# Patient Record
Sex: MALE | Race: BLACK OR AFRICAN AMERICAN | NOT HISPANIC OR LATINO | URBAN - METROPOLITAN AREA
[De-identification: names, ages, dates, MRNs, and addresses within clinical notes are randomized per-mention and may not be internally consistent; named-entity substitution may affect disease eponyms.]

---

## 2020-02-26 ENCOUNTER — EMERGENCY (EMERGENCY)
Facility: HOSPITAL | Age: 60
LOS: 0 days | Discharge: HOME | End: 2020-02-26
Attending: EMERGENCY MEDICINE
Payer: COMMERCIAL

## 2020-02-26 VITALS
OXYGEN SATURATION: 97 % | DIASTOLIC BLOOD PRESSURE: 106 MMHG | HEART RATE: 71 BPM | TEMPERATURE: 98 F | RESPIRATION RATE: 20 BRPM | SYSTOLIC BLOOD PRESSURE: 182 MMHG

## 2020-02-26 VITALS
OXYGEN SATURATION: 96 % | DIASTOLIC BLOOD PRESSURE: 83 MMHG | TEMPERATURE: 98 F | HEART RATE: 64 BPM | RESPIRATION RATE: 15 BRPM | SYSTOLIC BLOOD PRESSURE: 149 MMHG

## 2020-02-26 DIAGNOSIS — R07.9 CHEST PAIN, UNSPECIFIED: ICD-10-CM

## 2020-02-26 LAB
ALBUMIN SERPL ELPH-MCNC: 3.4 G/DL — SIGNIFICANT CHANGE UP (ref 3.3–5)
ALP SERPL-CCNC: 97 U/L — SIGNIFICANT CHANGE UP (ref 40–120)
ALT FLD-CCNC: 50 U/L — SIGNIFICANT CHANGE UP (ref 12–78)
ANION GAP SERPL CALC-SCNC: 4 MMOL/L — LOW (ref 5–17)
APTT BLD: 26.9 SEC — LOW (ref 27.5–36.3)
AST SERPL-CCNC: 22 U/L — SIGNIFICANT CHANGE UP (ref 15–37)
BASOPHILS # BLD AUTO: 0.03 K/UL — SIGNIFICANT CHANGE UP (ref 0–0.2)
BASOPHILS NFR BLD AUTO: 0.6 % — SIGNIFICANT CHANGE UP (ref 0–2)
BILIRUB SERPL-MCNC: 0.3 MG/DL — SIGNIFICANT CHANGE UP (ref 0.2–1.2)
BUN SERPL-MCNC: 19 MG/DL — SIGNIFICANT CHANGE UP (ref 7–23)
CALCIUM SERPL-MCNC: 8.8 MG/DL — SIGNIFICANT CHANGE UP (ref 8.5–10.1)
CHLORIDE SERPL-SCNC: 106 MMOL/L — SIGNIFICANT CHANGE UP (ref 96–108)
CO2 SERPL-SCNC: 29 MMOL/L — SIGNIFICANT CHANGE UP (ref 22–31)
CREAT SERPL-MCNC: 1.39 MG/DL — HIGH (ref 0.5–1.3)
D DIMER BLD IA.RAPID-MCNC: 393 NG/ML DDU — HIGH
EOSINOPHIL # BLD AUTO: 0.16 K/UL — SIGNIFICANT CHANGE UP (ref 0–0.5)
EOSINOPHIL NFR BLD AUTO: 3.4 % — SIGNIFICANT CHANGE UP (ref 0–6)
GLUCOSE SERPL-MCNC: 106 MG/DL — HIGH (ref 70–99)
HCT VFR BLD CALC: 45.8 % — SIGNIFICANT CHANGE UP (ref 39–50)
HGB BLD-MCNC: 14.5 G/DL — SIGNIFICANT CHANGE UP (ref 13–17)
IMM GRANULOCYTES NFR BLD AUTO: 0.4 % — SIGNIFICANT CHANGE UP (ref 0–1.5)
INR BLD: 1.03 RATIO — SIGNIFICANT CHANGE UP (ref 0.88–1.16)
LYMPHOCYTES # BLD AUTO: 2.18 K/UL — SIGNIFICANT CHANGE UP (ref 1–3.3)
LYMPHOCYTES # BLD AUTO: 46.3 % — HIGH (ref 13–44)
MCHC RBC-ENTMCNC: 25.7 PG — LOW (ref 27–34)
MCHC RBC-ENTMCNC: 31.7 GM/DL — LOW (ref 32–36)
MCV RBC AUTO: 81.2 FL — SIGNIFICANT CHANGE UP (ref 80–100)
MONOCYTES # BLD AUTO: 0.7 K/UL — SIGNIFICANT CHANGE UP (ref 0–0.9)
MONOCYTES NFR BLD AUTO: 14.9 % — HIGH (ref 2–14)
NEUTROPHILS # BLD AUTO: 1.62 K/UL — LOW (ref 1.8–7.4)
NEUTROPHILS NFR BLD AUTO: 34.4 % — LOW (ref 43–77)
NRBC # BLD: 0 /100 WBCS — SIGNIFICANT CHANGE UP (ref 0–0)
PLATELET # BLD AUTO: 226 K/UL — SIGNIFICANT CHANGE UP (ref 150–400)
POTASSIUM SERPL-MCNC: 4.3 MMOL/L — SIGNIFICANT CHANGE UP (ref 3.5–5.3)
POTASSIUM SERPL-SCNC: 4.3 MMOL/L — SIGNIFICANT CHANGE UP (ref 3.5–5.3)
PROT SERPL-MCNC: 7.7 GM/DL — SIGNIFICANT CHANGE UP (ref 6–8.3)
PROTHROM AB SERPL-ACNC: 11.6 SEC — SIGNIFICANT CHANGE UP (ref 10–12.9)
RBC # BLD: 5.64 M/UL — SIGNIFICANT CHANGE UP (ref 4.2–5.8)
RBC # FLD: 13.7 % — SIGNIFICANT CHANGE UP (ref 10.3–14.5)
SODIUM SERPL-SCNC: 139 MMOL/L — SIGNIFICANT CHANGE UP (ref 135–145)
TROPONIN I SERPL-MCNC: <.015 NG/ML — SIGNIFICANT CHANGE UP (ref 0.01–0.04)
TROPONIN I SERPL-MCNC: <.015 NG/ML — SIGNIFICANT CHANGE UP (ref 0.01–0.04)
WBC # BLD: 4.71 K/UL — SIGNIFICANT CHANGE UP (ref 3.8–10.5)
WBC # FLD AUTO: 4.71 K/UL — SIGNIFICANT CHANGE UP (ref 3.8–10.5)

## 2020-02-26 PROCEDURE — 71045 X-RAY EXAM CHEST 1 VIEW: CPT | Mod: 26

## 2020-02-26 PROCEDURE — 93010 ELECTROCARDIOGRAM REPORT: CPT

## 2020-02-26 PROCEDURE — 71275 CT ANGIOGRAPHY CHEST: CPT | Mod: 26

## 2020-02-26 PROCEDURE — 99285 EMERGENCY DEPT VISIT HI MDM: CPT

## 2020-02-26 RX ORDER — SODIUM CHLORIDE 9 MG/ML
1000 INJECTION INTRAMUSCULAR; INTRAVENOUS; SUBCUTANEOUS ONCE
Refills: 0 | Status: COMPLETED | OUTPATIENT
Start: 2020-02-26 | End: 2020-02-26

## 2020-02-26 RX ORDER — FAMOTIDINE 10 MG/ML
20 INJECTION INTRAVENOUS ONCE
Refills: 0 | Status: COMPLETED | OUTPATIENT
Start: 2020-02-26 | End: 2020-02-26

## 2020-02-26 RX ADMIN — FAMOTIDINE 20 MILLIGRAM(S): 10 INJECTION INTRAVENOUS at 05:54

## 2020-02-26 RX ADMIN — Medication 30 MILLILITER(S): at 05:54

## 2020-02-26 RX ADMIN — SODIUM CHLORIDE 1000 MILLILITER(S): 9 INJECTION INTRAMUSCULAR; INTRAVENOUS; SUBCUTANEOUS at 05:54

## 2020-02-26 NOTE — ED ADULT TRIAGE NOTE - CHIEF COMPLAINT QUOTE
Pt came in clutching chest, stating he woke up with CP Pt came in clutching chest, stating he woke up with CP, took asa prior to arrival.

## 2020-02-26 NOTE — ED PROVIDER NOTE - OBJECTIVE STATEMENT
59 year old male with PMH of HTN otherwise recently was on prednisone for sinusitis complaining of chest pain upon waking up around 3-4am - states felt like a burning sensation, pt has had increased stomach discomfort since use of steroids this past week. Denies any fever/chills, no chest wall trauma, and states CP now slightly improved in ED. Noted also that his BP has been elevated.

## 2020-02-26 NOTE — ED ADULT NURSE NOTE - OBJECTIVE STATEMENT
Patient c/o chest pain, dizziness and headache X 2 hours. Patient took asp 181mg prior to arriving. Recently diagnosed with HTN and started Olmesartan 40mg 1x day.

## 2020-02-26 NOTE — ED ADULT NURSE REASSESSMENT NOTE - NS ED NURSE REASSESS COMMENT FT1
Endorsed by off going nurse. He is alert and oriented respiration spontaneous and unlabored. He is on cardiac monitoring NSR with sinus arrhythmia. IV access patent.. Pending CT scan and repeat Troponin to be drawn.

## 2020-02-26 NOTE — ED PROVIDER NOTE - PATIENT PORTAL LINK FT
You can access the FollowMyHealth Patient Portal offered by [facility] by registering at the following website: [email or web address]. By joining [employer]’s FollowMyHealth portal, you will also be able to view your health information using other applications (apps) compatible with our system.

## 2020-02-26 NOTE — ED PROVIDER NOTE - PROGRESS NOTE DETAILS
Pt was seen and treated by Dr. Art CTA chest pain 2nd tropo are pending Pt is alert and oriented x 3 sts he had mid lower chest discomfortable without sob earlier this morning and he is fine now. Pt ate and tolerated breakfast and sts he has no more pain. Pt is given and explained all test reports and advised to follow up with Dr. Myrick cardiologist for out pt stress test and return if symptoms persist or worsen.

## 2020-02-26 NOTE — ED PROVIDER NOTE - CARDIAC, MLM
Normal rate, regular rhythm.  Heart sounds S1, S2.  No murmurs, rubs or gallops. MDD vs schizophrenia vs intellectual disability

## 2020-02-26 NOTE — ED PROVIDER NOTE - CLINICAL SUMMARY MEDICAL DECISION MAKING FREE TEXT BOX
pt with D-Dimer elevation- will send for CTA, second enzyme ordered as pt with CE first set neg, Daughter Bonnie Martinez called - asked if we could call her if anything changes [phone]

## 2020-03-01 ENCOUNTER — EMERGENCY (EMERGENCY)
Facility: HOSPITAL | Age: 60
LOS: 0 days | Discharge: HOME | End: 2020-03-01
Attending: EMERGENCY MEDICINE
Payer: COMMERCIAL

## 2020-03-01 VITALS
OXYGEN SATURATION: 97 % | HEART RATE: 67 BPM | DIASTOLIC BLOOD PRESSURE: 76 MMHG | SYSTOLIC BLOOD PRESSURE: 144 MMHG | RESPIRATION RATE: 19 BRPM

## 2020-03-01 VITALS
TEMPERATURE: 98 F | HEIGHT: 71 IN | OXYGEN SATURATION: 95 % | SYSTOLIC BLOOD PRESSURE: 177 MMHG | HEART RATE: 85 BPM | DIASTOLIC BLOOD PRESSURE: 96 MMHG | WEIGHT: 220.02 LBS | RESPIRATION RATE: 17 BRPM

## 2020-03-01 DIAGNOSIS — R51 HEADACHE: ICD-10-CM

## 2020-03-01 DIAGNOSIS — R07.9 CHEST PAIN, UNSPECIFIED: ICD-10-CM

## 2020-03-01 DIAGNOSIS — F41.9 ANXIETY DISORDER, UNSPECIFIED: ICD-10-CM

## 2020-03-01 DIAGNOSIS — I10 ESSENTIAL (PRIMARY) HYPERTENSION: ICD-10-CM

## 2020-03-01 DIAGNOSIS — R00.2 PALPITATIONS: ICD-10-CM

## 2020-03-01 LAB
ALBUMIN SERPL ELPH-MCNC: 3.4 G/DL — SIGNIFICANT CHANGE UP (ref 3.3–5)
ALP SERPL-CCNC: 76 U/L — SIGNIFICANT CHANGE UP (ref 40–120)
ALT FLD-CCNC: 45 U/L — SIGNIFICANT CHANGE UP (ref 12–78)
ANION GAP SERPL CALC-SCNC: 5 MMOL/L — SIGNIFICANT CHANGE UP (ref 5–17)
APTT BLD: 36.8 SEC — HIGH (ref 27.5–36.3)
AST SERPL-CCNC: 25 U/L — SIGNIFICANT CHANGE UP (ref 15–37)
BILIRUB SERPL-MCNC: 0.5 MG/DL — SIGNIFICANT CHANGE UP (ref 0.2–1.2)
BUN SERPL-MCNC: 18 MG/DL — SIGNIFICANT CHANGE UP (ref 7–23)
CALCIUM SERPL-MCNC: 8.5 MG/DL — SIGNIFICANT CHANGE UP (ref 8.5–10.1)
CHLORIDE SERPL-SCNC: 108 MMOL/L — SIGNIFICANT CHANGE UP (ref 96–108)
CO2 SERPL-SCNC: 27 MMOL/L — SIGNIFICANT CHANGE UP (ref 22–31)
CREAT SERPL-MCNC: 1.38 MG/DL — HIGH (ref 0.5–1.3)
GLUCOSE SERPL-MCNC: 150 MG/DL — HIGH (ref 70–99)
HCT VFR BLD CALC: 47.2 % — SIGNIFICANT CHANGE UP (ref 39–50)
HGB BLD-MCNC: 14.9 G/DL — SIGNIFICANT CHANGE UP (ref 13–17)
INR BLD: 1.17 RATIO — HIGH (ref 0.88–1.16)
MAGNESIUM SERPL-MCNC: 2 MG/DL — SIGNIFICANT CHANGE UP (ref 1.6–2.6)
MCHC RBC-ENTMCNC: 25.4 PG — LOW (ref 27–34)
MCHC RBC-ENTMCNC: 31.6 GM/DL — LOW (ref 32–36)
MCV RBC AUTO: 80.5 FL — SIGNIFICANT CHANGE UP (ref 80–100)
NRBC # BLD: 0 /100 WBCS — SIGNIFICANT CHANGE UP (ref 0–0)
PLATELET # BLD AUTO: 228 K/UL — SIGNIFICANT CHANGE UP (ref 150–400)
POTASSIUM SERPL-MCNC: 3.7 MMOL/L — SIGNIFICANT CHANGE UP (ref 3.5–5.3)
POTASSIUM SERPL-SCNC: 3.7 MMOL/L — SIGNIFICANT CHANGE UP (ref 3.5–5.3)
PROT SERPL-MCNC: 7.7 GM/DL — SIGNIFICANT CHANGE UP (ref 6–8.3)
PROTHROM AB SERPL-ACNC: 13.2 SEC — HIGH (ref 10–12.9)
RBC # BLD: 5.86 M/UL — HIGH (ref 4.2–5.8)
RBC # FLD: 13.2 % — SIGNIFICANT CHANGE UP (ref 10.3–14.5)
SODIUM SERPL-SCNC: 140 MMOL/L — SIGNIFICANT CHANGE UP (ref 135–145)
TROPONIN I SERPL-MCNC: <.015 NG/ML — SIGNIFICANT CHANGE UP (ref 0.01–0.04)
TROPONIN I SERPL-MCNC: <.015 NG/ML — SIGNIFICANT CHANGE UP (ref 0.01–0.04)
WBC # BLD: 3.53 K/UL — LOW (ref 3.8–10.5)
WBC # FLD AUTO: 3.53 K/UL — LOW (ref 3.8–10.5)

## 2020-03-01 PROCEDURE — 93010 ELECTROCARDIOGRAM REPORT: CPT

## 2020-03-01 PROCEDURE — 99285 EMERGENCY DEPT VISIT HI MDM: CPT

## 2020-03-01 PROCEDURE — 70450 CT HEAD/BRAIN W/O DYE: CPT | Mod: 26

## 2020-03-01 RX ORDER — ALPRAZOLAM 0.25 MG
0.25 TABLET ORAL ONCE
Refills: 0 | Status: DISCONTINUED | OUTPATIENT
Start: 2020-03-01 | End: 2020-03-01

## 2020-03-01 RX ORDER — SODIUM CHLORIDE 9 MG/ML
1000 INJECTION INTRAMUSCULAR; INTRAVENOUS; SUBCUTANEOUS ONCE
Refills: 0 | Status: COMPLETED | OUTPATIENT
Start: 2020-03-01 | End: 2020-03-01

## 2020-03-01 RX ORDER — ACETAMINOPHEN 500 MG
975 TABLET ORAL ONCE
Refills: 0 | Status: COMPLETED | OUTPATIENT
Start: 2020-03-01 | End: 2020-03-01

## 2020-03-01 RX ORDER — METOCLOPRAMIDE HCL 10 MG
10 TABLET ORAL ONCE
Refills: 0 | Status: COMPLETED | OUTPATIENT
Start: 2020-03-01 | End: 2020-03-01

## 2020-03-01 RX ADMIN — SODIUM CHLORIDE 1000 MILLILITER(S): 9 INJECTION INTRAMUSCULAR; INTRAVENOUS; SUBCUTANEOUS at 18:13

## 2020-03-01 RX ADMIN — Medication 975 MILLIGRAM(S): at 19:14

## 2020-03-01 RX ADMIN — Medication 10 MILLIGRAM(S): at 18:17

## 2020-03-01 RX ADMIN — Medication 975 MILLIGRAM(S): at 18:14

## 2020-03-01 RX ADMIN — Medication 0.25 MILLIGRAM(S): at 18:14

## 2020-03-01 RX ADMIN — SODIUM CHLORIDE 1000 MILLILITER(S): 9 INJECTION INTRAMUSCULAR; INTRAVENOUS; SUBCUTANEOUS at 19:20

## 2020-03-01 NOTE — ED PROVIDER NOTE - CLINICAL SUMMARY MEDICAL DECISION MAKING FREE TEXT BOX
BP resolved to 140s without meds. BP resolved to 140s without meds. pt is comfortable and well appearing in ER. Lab values do not require emergent intervention. CT scan demonstrates no acute pathology. Pt likely has component of anxiety. Pt likely with tension headache and component of anxiety. Pt denies cardiac history and reports normal stress test 2 years ago. Pending clinical improvement and repeat Ce. Patient signed out to incoming physician.  All decisions regarding the progression of care will be made at their discretion.

## 2020-03-01 NOTE — ED PROVIDER NOTE - CARE PLAN
Principal Discharge DX:	Chest pain  Secondary Diagnosis:	Hypertension  Secondary Diagnosis:	Headache Principal Discharge DX:	Chest pain  Secondary Diagnosis:	Hypertension  Secondary Diagnosis:	Headache  Secondary Diagnosis:	Anxiety

## 2020-03-01 NOTE — ED PROVIDER NOTE - PATIENT PORTAL LINK FT
You can access the FollowMyHealth Patient Portal offered by [facility] by registering at the following website: [email or web address]. By joining [employer]’s FollowMyHealth portal, you will also be able to view your health information using other applications (apps) compatible with our system.

## 2020-03-01 NOTE — ED PROVIDER NOTE - OBJECTIVE STATEMENT
60yo male with pmh HTN, presents with headache, jaw tingling, ss chest tingling pta today and checked BP and it was in 190s. Pt reports BP has been fluctuating since diagnosis 2 weeks ago. Pt has SS chest pressure 3 days ago and was seen in ER with neg CE x 2 and CTA. pt states no symptoms until today. Pt reports he was placed on benicar and had a lot of side effects so it was changed to amlodipine 5mg 3 days ago. Pt states he was feeling BP symptoms and not well yesterday so took amlodipine 10mg yesterday night. denies dizziness. Pt also felt his eyes were more injected. Pt currently reports only mild frontal headache. No chest pain/jaw tingling.     ROS: No fever/chills. No photophobia/eye pain/changes in vision, No ear pain/sore throat/dysphagia, + chest pain, no palpitations. no SOB/cough. No abdominal pain, No N/V/D, no black/bloody bm. No dysuria/frequency/discharge, + headache. No Dizziness.  No rash.  No numbness/tingling/weakness. 60yo male with pmh newly diagnosed HTN, presents with bitemporal headache, Lt jaw tingling, palpitations, and ss chest tingling just pta.  Pt then checked BP and it was in 180s. Pt reports everything was okay until  when he was diagnosed with HTN.  Pt reports  had sinus issues, seen at  given abx and prednisone. Pt only took 3 days of abx but took prednisone x 4 days. Pt reports on the last day of taking it he was walking home and felt near syncope and disoriented. Pt went to  and BP noted to be 190s. Pt told to go to ER, but he called his cardio who gave him antihtn. Pt took the olmesartan and next day at PMD office noted it was 130/80. Pt states day after that has been having headaches/palpitations and cardiologist lowered dose. 3 days ago pt had ss chest pressure and came to ER, he had CTA and 2 neg CE. Pt then called PMD who gave him amlodipine 5mg. Pt reports had been having bitemporal headache and not feeling well since. Last night pt felt sick so took 10mg of amlodipine. Pt woke up and felt good and bp was 140/80. Pt felt well until just prior to arrival when he was lying down and the temporal headache started. Pt then sat up and felt dizzy and near syncopal. then the palpitations started and some tingling in jaw and chest. Pt then though his had some periorbital edema under his eyes. And then he checked BP and it was 190s. Of note, pt had been dealing with multiple stressors. Both parents  in 6 months and he has been dealing with their estate as well as sibling issues.  Pt currently feeling well only c/o of ringing in ears and bitemporal head throbbing. no photophobia, dizziness, NV, CP, sob, palpitations.    ROS: No fever/chills. No photophobia/eye pain/changes in vision, No ear pain/sore throat/dysphagia, + chest pain, + palpitations. no SOB/cough. No abdominal pain, No N/V/D, no black/bloody bm. No dysuria/frequency/discharge, + headache. + Dizziness.  No rash.  No numbness/tingling/weakness.

## 2020-03-01 NOTE — ED ADULT NURSE NOTE - OBJECTIVE STATEMENT
BIBA patient states that blood pressure has been up and down since 2/19, reports ruptured blood vessels denies blurry vision, headache that subsided. stopped taking benicar due to side effects  amlodipine 5mg, doubled dose last night.     pt a&O x3 recently see here Tuesday for htn, dizziness, prescribed amlodipine 5 mg. pt cleared abd sent home. today pt present  c,o of bilateral posterior intermittent head pain 8/10.  pt states also achy bilateral lower abd pain. pt denies burning urination, hematuria. pt c,o of dizziness headache. pt denies chest pain at this time.

## 2020-03-01 NOTE — ED PROVIDER NOTE - CARE PROVIDER_API CALL
Jorge Mcnulty)  Cardiology  [address] [facility], [address] [place] [address]  Phone: [phone]  Fax: [phone]  Follow Up Time: 4-6 Days

## 2020-03-01 NOTE — ED PROVIDER NOTE - PROGRESS NOTE DETAILS
Results reported to patient--grossly benign, labs and imaging unremarkable   Pt. reports feeling better after meds  pt. agrees to f/u with primary care outpt., referred to cardio for f/u   pt. understands to return to ED if symptoms worsen; will d/c as per plan with prior attending of record

## 2020-03-01 NOTE — ED ADULT TRIAGE NOTE - CHIEF COMPLAINT QUOTE
BIBA patient states that blood pressure has been up and down since 2/19, reports ruptured blood vessels denies blurry vision, headache that subsided. stopped taking benicar due to side effects  amlodipine 5mg, doubled dose last night

## 2020-03-01 NOTE — ED PROVIDER NOTE - PHYSICAL EXAMINATION
Gen: Alert, Well appearing. NAD  , + appears anxious  Head: NC, AT, PERRL, normal lids/conjunctiva   ENT: Bilateral TM WNL, patent oropharynx without erythema/exudate, uvula midline  Neck: supple, no tenderness/meningismus  Pulm: Bilateral clear BS, normal resp effort  CV: RRR, no M/R/G, +dist pulses   Abd: soft, NT/ND, +BS, no guarding/rebound tenderness  Mskel: no edema/erythema/cyanosis   Skin: no rash, no bruising  Neuro: AAOx3, no sensory/motor deficits, CN 2-12 intact Gen: Alert, Well appearing. NAD  , + appears anxious  Head: NC, AT, PERRL, normal lids/conjunctiva   ENT: Bilateral TM WNL, patent oropharynx without erythema/exudate, uvula midline  Neck: supple, no tenderness/meningismus  Pulm: Bilateral clear BS, normal resp effort  CV: RRR, no M/R/G, +dist pulses   Abd: soft, NT/ND, +BS, no guarding/rebound tenderness  Mskel: no edema/erythema/cyanosis   Skin: no rash, no bruising  Neuro: AAOx3, no sensory/motor deficits. CN2-12 intact, No pronator drift. Normal finger to nose, heel to shin. No dysdiadochokinesis. Full power and sensation intact. Gait WNL.

## 2020-03-01 NOTE — ED ADULT NURSE NOTE - ED STAT RN HANDOFF DETAILS
Report received from LEANNA Overton at 7pm. Assessment available on KB. will continue to monitor. on cardiac monitor at bedside. family at bedside. awaiting repeat EKG & troponin Report received from LEANNA Overton at 7pm. Assessment available on KB. will continue to monitor. on cardiac monitor at bedside. family at bedside. awaiting repeat EKG & troponin. IVF infusing

## 2020-12-30 ENCOUNTER — TRANSCRIPTION ENCOUNTER (OUTPATIENT)
Age: 60
End: 2020-12-30

## 2021-01-30 DIAGNOSIS — Z01.818 ENCOUNTER FOR OTHER PREPROCEDURAL EXAMINATION: ICD-10-CM

## 2021-01-30 PROBLEM — Z00.00 ENCOUNTER FOR PREVENTIVE HEALTH EXAMINATION: Status: ACTIVE | Noted: 2021-01-30

## 2021-02-02 ENCOUNTER — APPOINTMENT (OUTPATIENT)
Dept: DISASTER EMERGENCY | Facility: CLINIC | Age: 61
End: 2021-02-02

## 2021-02-03 LAB — SARS-COV-2 N GENE NPH QL NAA+PROBE: NOT DETECTED

## 2021-10-04 ENCOUNTER — TRANSCRIPTION ENCOUNTER (OUTPATIENT)
Age: 61
End: 2021-10-04

## 2022-03-11 ENCOUNTER — APPOINTMENT (OUTPATIENT)
Dept: UROLOGY | Facility: CLINIC | Age: 62
End: 2022-03-11
Payer: COMMERCIAL

## 2022-03-11 VITALS
RESPIRATION RATE: 14 BRPM | WEIGHT: 220 LBS | HEART RATE: 65 BPM | HEIGHT: 72 IN | TEMPERATURE: 97.9 F | BODY MASS INDEX: 29.8 KG/M2 | SYSTOLIC BLOOD PRESSURE: 129 MMHG | DIASTOLIC BLOOD PRESSURE: 83 MMHG | OXYGEN SATURATION: 98 %

## 2022-03-11 DIAGNOSIS — Z78.9 OTHER SPECIFIED HEALTH STATUS: ICD-10-CM

## 2022-03-11 DIAGNOSIS — N48.89 OTHER SPECIFIED DISORDERS OF PENIS: ICD-10-CM

## 2022-03-11 DIAGNOSIS — Z80.42 FAMILY HISTORY OF MALIGNANT NEOPLASM OF PROSTATE: ICD-10-CM

## 2022-03-11 DIAGNOSIS — R35.1 NOCTURIA: ICD-10-CM

## 2022-03-11 DIAGNOSIS — Z12.5 ENCOUNTER FOR SCREENING FOR MALIGNANT NEOPLASM OF PROSTATE: ICD-10-CM

## 2022-03-11 DIAGNOSIS — Z86.79 PERSONAL HISTORY OF OTHER DISEASES OF THE CIRCULATORY SYSTEM: ICD-10-CM

## 2022-03-11 DIAGNOSIS — Z80.0 FAMILY HISTORY OF MALIGNANT NEOPLASM OF DIGESTIVE ORGANS: ICD-10-CM

## 2022-03-11 PROCEDURE — 99204 OFFICE O/P NEW MOD 45 MIN: CPT

## 2022-03-11 NOTE — PHYSICAL EXAM
[General Appearance - Well Developed] : well developed [Heart Rate And Rhythm] : Heart rate and rhythm were normal [] : no respiratory distress [Bowel Sounds] : normal bowel sounds [Urethral Meatus] : meatus normal [Epididymis] : the epididymides were normal [Testes Tenderness] : no tenderness of the testes [Prostate Enlargement] : the prostate was not enlarged [Prostate Tenderness] : the prostate was not tender [No Prostate Nodules] : no prostate nodules [FreeTextEntry1] : On the dorsal surface of the distal shaft, there is a ~2 cm x 1mm tubular, firm lesion that is mobile, not adhered to underlying structures. no overlying skin changes. no erythema. mild pain with palpation [Normal Station and Gait] : the gait and station were normal for the patient's age [Skin Color & Pigmentation] : normal skin color and pigmentation [No Focal Deficits] : no focal deficits [Oriented To Time, Place, And Person] : oriented to person, place, and time [No Palpable Adenopathy] : no palpable adenopathy

## 2022-03-11 NOTE — ASSESSMENT
[FreeTextEntry1] : Mr Martinez is a 61 y.o. M who presents with the following:\par \par #Penile concern following intercourse: Appears to have a thrombosed superficial vessel following vigorous intercourse.  This this is mobile, and unlikely to be a plaque of the tunica. Unlikely to be a penile fracture, given the absence of the pop, persistence of erections, lack of swelling, and bruising.  Discussed that if this is a thrombosis, this will likely slowly improve over the following weeks.  Discussed he can try Tylenol Motrin as needed for pain control. Recommended he avoid sexual intercourse for the next 1-2 weeks.\par \par #PSA screening: Prostate cancer screening was discussed today in detail.  I reviewed with the patient that the PSA is a nonspecific test for prostate cancer but is specific to the prostate.  We reviewed that multiple issues related to the prostate can all cause PSA to increase including prostate enlargement, prostate cancer, and prostate inflammation.  Discussed AUA guidelines on PSA screening. He is  and has a family history. DIMA negative.\par - PSA today\par - UA

## 2022-03-11 NOTE — HISTORY OF PRESENT ILLNESS
[FreeTextEntry1] : NIRALI KENDALL is a 61 year M who presents today as a new patient evaluation for the following:\par \par #Penile concern:\par He was having sexual intercourse on Monday evening and during this, his penis exited his partners vagina ("from behind") and hit his penis against his partner's perineum when trying to re-enter his penis. He denies hearing a pop, and did not lose his erection. Did not have pain at this time. He was able to orgasm and ejaculate shortly after this.  He was in the shower following this, he began to notice a small amount of pain in his penis which persisted to the next day.  The next day, he noticed a "hard knot "on the distal part of his shaft.  This is been becoming more soft over the past several days.  He has less pain today.  Denies any bruising, swelling.  Denies any gross hematuria, dysuria.  He is still having erections, but has not had intercourse since.\par \par #PSA screening\par His father, and maternal grandfather both have prostate cancer.  Both parents from this disease.  He does not remember his last PSA check. Has nocturia x 0-2, which varies based on his work schedule (he works midnights). Denies retention episodes, UTIs. Denies gross hematuria, flank pain, fevers, chills, nausea, vomiting.

## 2022-03-11 NOTE — REVIEW OF SYSTEMS
[Heartburn] : heartburn [Wake up at night to urinate  How many times?  ___] : wakes up to urinate [unfilled] times during the night [Negative] : Heme/Lymph

## 2022-03-14 LAB
APPEARANCE: CLEAR
BACTERIA: NEGATIVE
BILIRUBIN URINE: NEGATIVE
BLOOD URINE: NEGATIVE
COLOR: NORMAL
GLUCOSE QUALITATIVE U: NEGATIVE
HYALINE CASTS: 0 /LPF
KETONES URINE: NEGATIVE
LEUKOCYTE ESTERASE URINE: NEGATIVE
MICROSCOPIC-UA: NORMAL
NITRITE URINE: NEGATIVE
PH URINE: 6
PROTEIN URINE: NEGATIVE
PSA SERPL-MCNC: 0.55 NG/ML
RED BLOOD CELLS URINE: 2 /HPF
SPECIFIC GRAVITY URINE: 1.02
SQUAMOUS EPITHELIAL CELLS: 0 /HPF
UROBILINOGEN URINE: NORMAL
WHITE BLOOD CELLS URINE: 0 /HPF

## 2022-04-05 ENCOUNTER — APPOINTMENT (OUTPATIENT)
Dept: ORTHOPEDIC SURGERY | Facility: CLINIC | Age: 62
End: 2022-04-05
Payer: OTHER MISCELLANEOUS

## 2022-04-05 PROCEDURE — 99213 OFFICE O/P EST LOW 20 MIN: CPT

## 2022-04-05 PROCEDURE — 99072 ADDL SUPL MATRL&STAF TM PHE: CPT

## 2022-04-05 NOTE — REASON FOR VISIT
[FreeTextEntry2] : left shoulder here for followup missed a couple visits for therapy sicne he was away,  still has some stiffness at the end range \par he is working full duty \par going to therapy

## 2022-04-05 NOTE — DISCUSSION/SUMMARY
[Medication Risks Reviewed] : Medication risks reviewed [de-identified] : receommend to xont terapy for one more month and will cont strengething\par  will follow up as needed

## 2022-06-07 ENCOUNTER — APPOINTMENT (OUTPATIENT)
Dept: ORTHOPEDIC SURGERY | Facility: CLINIC | Age: 62
End: 2022-06-07
Payer: COMMERCIAL

## 2022-06-07 VITALS — BODY MASS INDEX: 29.12 KG/M2 | WEIGHT: 215 LBS | HEIGHT: 72 IN

## 2022-06-07 PROCEDURE — 73110 X-RAY EXAM OF WRIST: CPT | Mod: LT

## 2022-06-07 PROCEDURE — 99213 OFFICE O/P EST LOW 20 MIN: CPT

## 2022-06-07 NOTE — IMAGING
[Left] : left wrist [There are no fractures, subluxations or dislocations. No significant abnormalities are seen] : There are no fractures, subluxations or dislocations. No significant abnormalities are seen

## 2022-06-08 NOTE — HISTORY OF PRESENT ILLNESS
[4] : 4 [0] : 0 [Dull/Aching] : dull/aching [Localized] : localized [Intermittent] : intermittent [Full time] : Work status: full time [de-identified] : 6/7/22:  Pt has mass on left volar wrist [] : no [FreeTextEntry1] : Lt hand [FreeTextEntry3] : 3 months [FreeTextEntry5] : Pt states he has a cyst on the  palm of his lt hand   [de-identified] : None

## 2022-06-08 NOTE — ASSESSMENT
[FreeTextEntry1] : mass disappears with pressure- mri to eval for possible ganglion versus pseudoaneurysm\par discussed possibility of surgery

## 2022-06-10 ENCOUNTER — APPOINTMENT (OUTPATIENT)
Dept: MRI IMAGING | Facility: CLINIC | Age: 62
End: 2022-06-10

## 2022-06-14 ENCOUNTER — APPOINTMENT (OUTPATIENT)
Dept: ORTHOPEDIC SURGERY | Facility: CLINIC | Age: 62
End: 2022-06-14

## 2022-06-27 ENCOUNTER — FORM ENCOUNTER (OUTPATIENT)
Age: 62
End: 2022-06-27

## 2022-06-28 ENCOUNTER — APPOINTMENT (OUTPATIENT)
Dept: MRI IMAGING | Facility: CLINIC | Age: 62
End: 2022-06-28

## 2022-06-28 PROCEDURE — 73221 MRI JOINT UPR EXTREM W/O DYE: CPT | Mod: LT

## 2022-07-14 ENCOUNTER — APPOINTMENT (OUTPATIENT)
Dept: ORTHOPEDIC SURGERY | Facility: CLINIC | Age: 62
End: 2022-07-14

## 2022-07-14 VITALS — BODY MASS INDEX: 29.12 KG/M2 | WEIGHT: 215 LBS | HEIGHT: 72 IN

## 2022-07-14 PROCEDURE — 99214 OFFICE O/P EST MOD 30 MIN: CPT

## 2022-07-14 NOTE — ASSESSMENT
[FreeTextEntry1] : We reviewed the pathology and treatment options- patient aware that options include observation, aspiration, surgery- aspiration with 50/50 chance of resolution, surgery usually 95-97% effective, although some studies indicate that recurrence may be as high as 25%. Today the patient is amenable to surgical excision,  Understands that with volar masses chance of arterial injury which may result in ecchymosis and occasional hematoma, recurrence, stiffness due to dissection to the joint capsule as well as general risks of surgery- bleeding, infection, injury to nerves, vessels or tendons, need for future surgery, loss of limb, loss of life. all questions answered and patient agrees to the surgical plan.  post op protocol and expectations were reviewed.\par

## 2022-07-14 NOTE — HISTORY OF PRESENT ILLNESS
[4] : 4 [0] : 0 [Dull/Aching] : dull/aching [Localized] : localized [Intermittent] : intermittent [Full time] : Work status: full time [de-identified] : 7/14/22:  Pt here s/p MRI\par \par MRI left wrist:\par 1. Slightly smaller soft tissue cystic area in the volar radial aspect of the wrist, which extends superficially, over \par an area measuring 1.2 cm in the same location as the prior study but smaller. The findings could represent soft \par tissue ganglion. The possibility of a vascular structure cannot be excluded. There are no aggressive MRI \par characteristics. Consider ultrasound Doppler to further evaluate for any internal blood flow.\par 2. Complex TFCC tearing, partial scapholunate ligament tearing and slight radiocarpal effusion without acute \par fracture, malalignment or tendon discontinuity.\par \par 6/7/22:  Pt has mass on left volar wrist [] : no [FreeTextEntry1] : Lt hand [FreeTextEntry3] : 3 months [FreeTextEntry5] : Pt states he has a cyst on the  palm of his lt hand   [de-identified] : None

## 2022-07-28 ENCOUNTER — APPOINTMENT (OUTPATIENT)
Dept: ORTHOPEDIC SURGERY | Facility: CLINIC | Age: 62
End: 2022-07-28

## 2022-07-28 VITALS — HEIGHT: 62 IN | BODY MASS INDEX: 39.56 KG/M2 | WEIGHT: 215 LBS

## 2022-07-28 PROCEDURE — 72110 X-RAY EXAM L-2 SPINE 4/>VWS: CPT

## 2022-07-28 PROCEDURE — 99213 OFFICE O/P EST LOW 20 MIN: CPT

## 2022-07-28 NOTE — ASSESSMENT
[FreeTextEntry1] : 62 y/o M with chronic back pain, failed conservative mgmt over the last 3 months with nsaids, HEP and chiro. Patient will obtain MRI of lumbar spine to evaluate HNP. Has responded well to TPIs in the past, consider repeat pending results.

## 2022-07-28 NOTE — PHYSICAL EXAM
[Able to Communicate] : able to communicate [Well Developed] : well developed [Well Nourished] : well nourished [Peripheral vascular exam is grossly normal] : peripheral vascular exam is grossly normal [No Respiratory Distress] : no respiratory distress [Lungs clear to auscultation bilaterally] : lungs clear to auscultation bilaterally [Flexion] : flexion [Extension] : extension [Bending to left] : bending to left [Bending to right] : bending to right [No bony abnormalities] : No bony abnormalities [] : motor exam is 5/5 throughout both lower extremities with normal tone [TWNoteComboBox7] : forward flexion 60 degrees [de-identified] : extension 30 degrees

## 2022-08-02 ENCOUNTER — APPOINTMENT (OUTPATIENT)
Dept: ORTHOPEDIC SURGERY | Facility: CLINIC | Age: 62
End: 2022-08-02

## 2022-08-02 ENCOUNTER — FORM ENCOUNTER (OUTPATIENT)
Age: 62
End: 2022-08-02

## 2022-08-02 VITALS — BODY MASS INDEX: 29.12 KG/M2 | WEIGHT: 215 LBS | HEIGHT: 72 IN

## 2022-08-02 PROCEDURE — 99072 ADDL SUPL MATRL&STAF TM PHE: CPT

## 2022-08-02 PROCEDURE — 99214 OFFICE O/P EST MOD 30 MIN: CPT | Mod: 25

## 2022-08-02 PROCEDURE — 20611 DRAIN/INJ JOINT/BURSA W/US: CPT

## 2022-08-02 PROCEDURE — J3490M: CUSTOM

## 2022-08-02 NOTE — PROCEDURE
[Large Joint Injection] : Large joint injection [Left] : of the left [Subacromial Space] : subacromial space [Inflammation] : inflammation [Betadine] : betadine [Ethyl Chloride sprayed topically] : ethyl chloride sprayed topically [Sterile technique used] : sterile technique used [___ cc    3mg] :  Betamethasone (Celestone) ~Vcc of 3mg [___ cc    0.25%] : Bupivacaine (Marcaine) ~Vcc of 0.25%  [] : Patient tolerated procedure well [Call if redness, pain or fever occur] : call if redness, pain or fever occur [Apply ice for 15min out of every hour for the next 12-24 hours as tolerated] : apply ice for 15 minutes out of every hour for the next 12-24 hours as tolerated [Previous OTC use and PT nontherapeutic] : patient has tried OTC's including aspirin, Ibuprofen, Aleve, etc or prescription NSAIDS, and/or exercises at home and/or physical therapy without satisfactory response [Risks, benefits, alternatives discussed / Verbal consent obtained] : the risks benefits, and alternatives have been discussed, and verbal consent was obtained [Precise injection in area of tear] : precise injection in area of tear [All ultrasound images have been permanently captured and stored accordingly in our picture archiving and communication system] : All ultrasound images have been permanently captured and stored accordingly in our picture archiving and communication system [Visualization of the needle and placement of injection was performed without complication] : visualization of the needle and placement of injection was performed without complication [Pain] : pain

## 2022-08-03 ENCOUNTER — APPOINTMENT (OUTPATIENT)
Dept: MRI IMAGING | Facility: CLINIC | Age: 62
End: 2022-08-03

## 2022-08-03 PROCEDURE — 72148 MRI LUMBAR SPINE W/O DYE: CPT

## 2022-08-03 NOTE — HISTORY OF PRESENT ILLNESS
[de-identified] : pt is here for mike shoulder follow up WC 3/13/15. pain when turning, reaching for something, driving at night. pt uses ice, heat and voltaren gel. Sp 2019, 2021 rtc repair both shoulders. pt works full time, looking to retire next month

## 2022-08-03 NOTE — DISCUSSION/SUMMARY
[Medication Risks Reviewed] : Medication risks reviewed [Surgical risks reviewed] : Surgical risks reviewed [de-identified] : signs and symptoms of rotator cuff tear, discussed risks of potential rotator cuff  surgery and risks of operative  and non operative treatment of of tendonitis and impingement, will obtain mri to see if surgery is necessary and guide future treatment, in interim discussed use of nsaids and side effects and recommended rehab and discussed risks and benefits of injection\par \par \par evaluated shoulder and decided to proceed with celestone injections, discussed future treatment and option, will proceed, discussed possible surgery vs alternatives in future\par The risks, benefits and contents of the injection have been discussed.  Risks include but are not limited to allergic reaction, flare reaction, permanent white skin discoloration at the injection site and infection.  The patient understands the risks and agrees to having the injection.  All questions have been answered.\par celestone in the LEFT SHOULDER Discussed the timing of the injections and the follow up that is needed. Advised the pt to ice the area that was injected and informed the pt it may take a few days for the injection to give relief.\par \par \par

## 2022-08-08 ENCOUNTER — APPOINTMENT (OUTPATIENT)
Dept: ORTHOPEDIC SURGERY | Facility: CLINIC | Age: 62
End: 2022-08-08

## 2022-08-08 VITALS — WEIGHT: 215 LBS | BODY MASS INDEX: 29.12 KG/M2 | HEIGHT: 72 IN

## 2022-08-08 PROCEDURE — 99214 OFFICE O/P EST MOD 30 MIN: CPT

## 2022-08-08 NOTE — HISTORY OF PRESENT ILLNESS
[Lower back] : lower back [6] : 6 [Shooting] : shooting [de-identified] : back pain and heel pain ; all the time;  neck has begun to hurt as well [FreeTextEntry5] :  NIRALI KENDALL is a 61 year male who is here today for lower back pain. Had an MRI done and is here to review results. He states he gets a shooting pain on and off. [de-identified] : MRI

## 2022-08-08 NOTE — ASSESSMENT
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
none

## 2022-08-30 ENCOUNTER — LABORATORY RESULT (OUTPATIENT)
Age: 62
End: 2022-08-30

## 2022-08-30 ENCOUNTER — APPOINTMENT (OUTPATIENT)
Dept: PAIN MANAGEMENT | Facility: CLINIC | Age: 62
End: 2022-08-30

## 2022-09-01 ENCOUNTER — RESULT REVIEW (OUTPATIENT)
Age: 62
End: 2022-09-01

## 2022-09-02 ENCOUNTER — APPOINTMENT (OUTPATIENT)
Age: 62
End: 2022-09-02

## 2022-09-02 PROCEDURE — 25076 EXC FOREARM TUM DEEP < 3 CM: CPT | Mod: LT

## 2022-09-15 ENCOUNTER — APPOINTMENT (OUTPATIENT)
Dept: ORTHOPEDIC SURGERY | Facility: CLINIC | Age: 62
End: 2022-09-15

## 2022-09-15 VITALS — WEIGHT: 215 LBS | BODY MASS INDEX: 29.12 KG/M2 | HEIGHT: 72 IN

## 2022-09-15 PROCEDURE — 99024 POSTOP FOLLOW-UP VISIT: CPT

## 2022-09-15 NOTE — HISTORY OF PRESENT ILLNESS
[4] : 4 [0] : 0 [Dull/Aching] : dull/aching [Localized] : localized [Intermittent] : intermittent [Full time] : Work status: full time [de-identified] : DOS: 9/2/2022\par \par 9/15/2022: Pt here for f/u s/p  left wrist ganglion excision. \par Pathology report 9/2/2022 shows a ganglion cyst.\par Pt reports only minimal discomfort. \par \par \par 7/14/22:  Pt here s/p MRI\par \par MRI left wrist:\par 1. Slightly smaller soft tissue cystic area in the volar radial aspect of the wrist, which extends superficially, over \par an area measuring 1.2 cm in the same location as the prior study but smaller. The findings could represent soft \par tissue ganglion. The possibility of a vascular structure cannot be excluded. There are no aggressive MRI \par characteristics. Consider ultrasound Doppler to further evaluate for any internal blood flow.\par 2. Complex TFCC tearing, partial scapholunate ligament tearing and slight radiocarpal effusion without acute \par fracture, malalignment or tendon discontinuity.\par \par 6/7/22:  Pt has mass on left volar wrist [] : no [FreeTextEntry1] : Lt hand [FreeTextEntry3] : 3 months [FreeTextEntry5] : Pt states he has a cyst on the  palm of his lt hand   [de-identified] : None

## 2022-09-15 NOTE — IMAGING
[Left] : left wrist [There are no fractures, subluxations or dislocations. No significant abnormalities are seen] : There are no fractures, subluxations or dislocations. No significant abnormalities are seen [de-identified] : Left wrist:\par wound clean dry and intact\par no erythema\par no drainage\par FAROM\par Strength is 5/5\par NV unchanged\par sutures removed\par

## 2022-12-20 ENCOUNTER — APPOINTMENT (OUTPATIENT)
Dept: ORTHOPEDIC SURGERY | Facility: CLINIC | Age: 62
End: 2022-12-20

## 2022-12-20 VITALS — BODY MASS INDEX: 29.12 KG/M2 | HEIGHT: 72 IN | WEIGHT: 215 LBS

## 2022-12-20 DIAGNOSIS — M25.569 PAIN IN UNSPECIFIED KNEE: ICD-10-CM

## 2022-12-20 PROCEDURE — 20611 DRAIN/INJ JOINT/BURSA W/US: CPT | Mod: LT

## 2022-12-20 PROCEDURE — 99214 OFFICE O/P EST MOD 30 MIN: CPT | Mod: 25

## 2022-12-20 PROCEDURE — 73562 X-RAY EXAM OF KNEE 3: CPT | Mod: LT

## 2022-12-20 PROCEDURE — J3490M: CUSTOM | Mod: LT

## 2022-12-20 NOTE — DISCUSSION/SUMMARY
[de-identified] : Progress Note completed by Meera Coulter PA-C\par * Dr. Cook -- The documentation recorded in this note accurately reflects the decisions made by me during this visit.

## 2022-12-20 NOTE — HISTORY OF PRESENT ILLNESS
[8] : 8 [7] : 7 [Constant] : constant [de-identified] : 12/20/22:  acute onset of left knee pain after feeling a sharp pain in knee after getting a massage october 2022.   [FreeTextEntry1] : left knee [FreeTextEntry5] : No injury, patient started occurring gradually

## 2022-12-20 NOTE — ASSESSMENT
[FreeTextEntry1] : acute onset of left knee pain after feeling a sharp pain in knee after getting a massage october 2022.   medial knee pain.  possible mmt. \par \par retired [employer].

## 2022-12-20 NOTE — PHYSICAL EXAM
[5___] : hamstring 5[unfilled]/5 [Positive] : positive Mary [] : patient ambulates without assistive device [Left] : left knee [Degenerative change] : Degenerative change [TWNoteComboBox7] : flexion 110 degrees [de-identified] : extension 3 degrees

## 2023-01-03 ENCOUNTER — FORM ENCOUNTER (OUTPATIENT)
Age: 63
End: 2023-01-03

## 2023-01-04 ENCOUNTER — APPOINTMENT (OUTPATIENT)
Dept: ORTHOPEDIC SURGERY | Facility: CLINIC | Age: 63
End: 2023-01-04
Payer: OTHER MISCELLANEOUS

## 2023-01-04 ENCOUNTER — APPOINTMENT (OUTPATIENT)
Dept: MRI IMAGING | Facility: CLINIC | Age: 63
End: 2023-01-04
Payer: COMMERCIAL

## 2023-01-04 DIAGNOSIS — M75.02 ADHESIVE CAPSULITIS OF LEFT SHOULDER: ICD-10-CM

## 2023-01-04 PROCEDURE — 99455 WORK RELATED DISABILITY EXAM: CPT

## 2023-01-04 PROCEDURE — 99072 ADDL SUPL MATRL&STAF TM PHE: CPT

## 2023-01-04 PROCEDURE — 73721 MRI JNT OF LWR EXTRE W/O DYE: CPT | Mod: LT

## 2023-01-09 PROBLEM — M75.02 ADHESIVE CAPSULITIS OF LEFT SHOULDER: Status: ACTIVE | Noted: 2022-04-05

## 2023-01-09 NOTE — HISTORY OF PRESENT ILLNESS
[de-identified] : Patient is here for a WC DOI: 3/13/15 follow up of the left shoulder. Pt notes no improvement in the left shoulder since last visit. Pt had celestone injection in left shoulder on 8/2/22 that did not have any relief for the left shoulder. Pt has been having constant pain in the left shoulder, worse with movement.

## 2023-01-09 NOTE — WORK
[Has the patient reached Maximum Medical Improvement? If yes, indicate date___] : Yes, on [unfilled] [Is there permanent partial impairment?] : Yes [FreeTextEntry6] : left shoulder [Left] : left [Yes] : Yes [FreeTextEntry7] : shoulder [FreeTextEntry8] : forward 100, no ER or IR [de-identified] : 180,90,90 [de-identified] : frozen shoulder after cuff repair [FreeTextEntry5] : 30%

## 2023-01-10 ENCOUNTER — APPOINTMENT (OUTPATIENT)
Dept: ORTHOPEDIC SURGERY | Facility: CLINIC | Age: 63
End: 2023-01-10
Payer: COMMERCIAL

## 2023-01-10 PROCEDURE — 99214 OFFICE O/P EST MOD 30 MIN: CPT

## 2023-01-10 NOTE — ASSESSMENT
[FreeTextEntry1] : acute onset of left knee pain after feeling a sharp pain in knee after getting a massage october 2022.   knee pain.  csi on 12/2022. mri 2023 shows lmt and some oa.\par \par retired police.

## 2023-01-10 NOTE — DISCUSSION/SUMMARY
[de-identified] : Progress Note completed by Meera Coulter PA-C\par * Dr. Cook -- The documentation recorded in this note accurately reflects the decisions made by me during this visit.

## 2023-01-10 NOTE — PHYSICAL EXAM
[5___] : hamstring 5[unfilled]/5 [Positive] : positive Mary [Left] : left knee [Degenerative change] : Degenerative change [] : antalgic [TWNoteComboBox7] : flexion 115 degrees [de-identified] : extension 0 degrees

## 2023-01-10 NOTE — HISTORY OF PRESENT ILLNESS
[de-identified] : 12/20/22:  acute onset of left knee pain after feeling a sharp pain in knee after getting a massage october 2022.  \par 1/10/23:  still pain.  some relief with csi last visit.

## 2023-02-06 ENCOUNTER — APPOINTMENT (OUTPATIENT)
Dept: ORTHOPEDIC SURGERY | Facility: CLINIC | Age: 63
End: 2023-02-06
Payer: COMMERCIAL

## 2023-02-06 VITALS — BODY MASS INDEX: 29.12 KG/M2 | HEIGHT: 72 IN | WEIGHT: 215 LBS

## 2023-02-06 DIAGNOSIS — M48.062 SPINAL STENOSIS, LUMBAR REGION WITH NEUROGENIC CLAUDICATION: ICD-10-CM

## 2023-02-06 DIAGNOSIS — M54.50 LOW BACK PAIN, UNSPECIFIED: ICD-10-CM

## 2023-02-06 PROCEDURE — 99214 OFFICE O/P EST MOD 30 MIN: CPT

## 2023-02-06 NOTE — HISTORY OF PRESENT ILLNESS
[Lower back] : lower back [6] : 6 [Shooting] : shooting [de-identified] : worsening back pain after going to [place] last week; denies sciatic pain; tried 2 muscle relaxants which helped; planned to move to [place] in the next few weeks;  [FreeTextEntry5] :  NIRALI KENDALL is a 61 year male who is here today for lower back pain. Had an MRI done and is here to review results. He states he gets a shooting pain on and off. [de-identified] : MRI

## 2023-02-06 NOTE — PHYSICAL EXAM
[Extension] : extension [] : motor exam is 5/5 throughout both lower extremities with normal tone [de-identified] : able to heel/toe walk

## 2023-02-06 NOTE — PROCEDURE
[FreeTextEntry3] : Patient seen by Saadia Palmer PA-C under the supervision of Carter Calvin MD\par

## 2023-02-06 NOTE — ASSESSMENT
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

## 2023-02-07 ENCOUNTER — APPOINTMENT (OUTPATIENT)
Dept: ORTHOPEDIC SURGERY | Facility: CLINIC | Age: 63
End: 2023-02-07
Payer: COMMERCIAL

## 2023-02-07 VITALS — BODY MASS INDEX: 29.12 KG/M2 | WEIGHT: 215 LBS | HEIGHT: 72 IN

## 2023-02-07 DIAGNOSIS — S83.92XA SPRAIN OF UNSPECIFIED SITE OF LEFT KNEE, INITIAL ENCOUNTER: ICD-10-CM

## 2023-02-07 PROCEDURE — 99213 OFFICE O/P EST LOW 20 MIN: CPT

## 2023-02-07 NOTE — HISTORY OF PRESENT ILLNESS
[7] : 7 [5] : 5 [Dull/Aching] : dull/aching [Sharp] : sharp [de-identified] : 12/20/22:  acute onset of left knee pain after feeling a sharp pain in knee after getting a massage october 2022.  \par 1/10/23:  still pain.  some relief with csi last visit. \par 2/7/23:  continued pain.  having stiffness.  [] : Post Surgical Visit: no [FreeTextEntry1] : left knee

## 2023-02-07 NOTE — DISCUSSION/SUMMARY
[de-identified] : Progress Note completed by Meera Coulter PA-C\par * Dr. Cook -- The documentation recorded in this note accurately reflects the decisions made by me during this visit.

## 2023-02-07 NOTE — PHYSICAL EXAM
[5___] : hamstring 5[unfilled]/5 [Positive] : positive Mary [] : patient ambulates without assistive device [Left] : left knee [Degenerative change] : Degenerative change [TWNoteComboBox7] : flexion 115 degrees [de-identified] : extension 0 degrees

## 2023-02-07 NOTE — ASSESSMENT
[FreeTextEntry1] : acute onset of left knee pain after feeling a sharp pain in knee after getting a massage october 2022.   knee pain.  csi on 12/2022. mri 2023 shows lmt and some oa.  visco denied by insurance. \par \par retired police.

## 2023-04-10 ENCOUNTER — APPOINTMENT (OUTPATIENT)
Dept: ORTHOPEDIC SURGERY | Facility: CLINIC | Age: 63
End: 2023-04-10
Payer: COMMERCIAL

## 2023-04-10 VITALS — WEIGHT: 215 LBS | BODY MASS INDEX: 29.12 KG/M2 | HEIGHT: 72 IN

## 2023-04-10 DIAGNOSIS — M47.816 SPONDYLOSIS W/OUT MYELOPATHY OR RADICULOPATHY, LUMBAR REGION: ICD-10-CM

## 2023-04-10 PROCEDURE — 99214 OFFICE O/P EST MOD 30 MIN: CPT

## 2023-04-10 NOTE — HISTORY OF PRESENT ILLNESS
[Lower back] : lower back [6] : 6 [Localized] : localized [Shooting] : shooting [Constant] : constant [Ice] : ice [de-identified] : completed an initial visit to PT;  has been applying CBD oil to LB and it has been helpful;  doing HEP for his core;  hot tub is helpful as well;  sitting on ice for relief [] : no [FreeTextEntry5] :  NIRALI KENDALL is a 61 year male following up for lower back pain. Drove 13 hrs to [place]; pain was aggravated. Ice has been helping.  Need new PT script.  [de-identified] : MRI

## 2023-04-10 NOTE — ASSESSMENT
[FreeTextEntry1] : went over MRI images again: there exists bilateral 5-1 foraminal stenosis;  if the radicular or back pain component predominates then FESIs would be indicated; ultimately would need foraminotomy if surgery becomes inevitable;

## 2023-04-11 ENCOUNTER — APPOINTMENT (OUTPATIENT)
Dept: ORTHOPEDIC SURGERY | Facility: CLINIC | Age: 63
End: 2023-04-11
Payer: OTHER MISCELLANEOUS

## 2023-04-11 VITALS — BODY MASS INDEX: 29.12 KG/M2 | WEIGHT: 215 LBS | HEIGHT: 72 IN

## 2023-04-11 PROCEDURE — 99455 WORK RELATED DISABILITY EXAM: CPT

## 2023-04-13 NOTE — HISTORY OF PRESENT ILLNESS
[de-identified] : Patient is here for a worker's comp follow up appointment for bilateral shoulders, DOI 3/13/15. Patient states pain has worsened since the previous visit. Patient also notes pain is occurring in the right elbow. Patient notes strength in his hands has worsened and he has trouble with gripping objects. Patient states use of ice helps with the pain. Patient is not currently attending physical. Patient is no longer working, retired.

## 2023-04-13 NOTE — WORK
[Right] : right [No ___________] : No: [unfilled] [Left] : left [FreeTextEntry7] : shoulder [FreeTextEntry8] : 170 ff, 130 abduction, 0 ER and IR [FreeTextEntry5] : 27.5% [de-identified] : motion measure 3 times each side with goniometer [de-identified] : shoulder [de-identified] : 170ff, 130 abduction, 0ER and IR [de-identified] : 27.5% [de-identified] : knee [de-identified] : 0-140 [de-identified] : 0 [Has the patient reached Maximum Medical Improvement? If yes, indicate date___] : Yes, on [unfilled] [Is there permanent partial impairment?] : Yes

## 2023-04-13 NOTE — PHYSICAL EXAM
[Bilateral] : shoulder bilaterally [4 ___] : forward flexion 4[unfilled]/5 [4___] : abduction 4[unfilled]/5 [] : no atrophy [TWNoteComboBox7] : active forward flexion 170 degrees [de-identified] : active abduction 130 degrees [TWNoteComboBox6] : internal rotation 0 degrees [de-identified] : external rotation 0 degrees

## 2023-04-13 NOTE — DISCUSSION/SUMMARY
[Medication Risks Reviewed] : Medication risks reviewed [Surgical risks reviewed] : Surgical risks reviewed [de-identified] : Patient presents for [facility] today. \par Advised patient to continue with appropriate HEP and stretching. \par Hx of previous surgery on bilateral shoulder. \par Follow up on a PRN basis unless new symptoms arise

## 2023-04-17 ENCOUNTER — APPOINTMENT (OUTPATIENT)
Dept: ORTHOPEDIC SURGERY | Facility: CLINIC | Age: 63
End: 2023-04-17

## 2023-04-18 ENCOUNTER — APPOINTMENT (OUTPATIENT)
Dept: ORTHOPEDIC SURGERY | Facility: CLINIC | Age: 63
End: 2023-04-18
Payer: COMMERCIAL

## 2023-04-18 VITALS — BODY MASS INDEX: 29.12 KG/M2 | HEIGHT: 72 IN | WEIGHT: 215 LBS

## 2023-04-18 PROCEDURE — 99213 OFFICE O/P EST LOW 20 MIN: CPT

## 2023-04-18 NOTE — HISTORY OF PRESENT ILLNESS
[7] : 7 [0] : 0 [Dull/Aching] : dull/aching [Sharp] : sharp [Rest] : rest [Meds] : meds [Heat] : heat [Lying in bed] : lying in bed [de-identified] : 12/20/22:  acute onset of left knee pain after feeling a sharp pain in knee after getting a massage october 2022.  \par 1/10/23:  still pain.  some relief with csi last visit. \par 2/7/23:  continued pain.  having stiffness. \par 4/18/23:  left knee pain persists. [] : Post Surgical Visit: no [FreeTextEntry1] : left knee [FreeTextEntry9] : c

## 2023-04-18 NOTE — ASSESSMENT
[FreeTextEntry1] : acute onset of left knee pain after feeling a sharp pain in knee after getting a massage october 2022.   knee pain.  csi on 12/2022. mri 2023 shows lmt and some oa.  visco denied by insurance to this point.\par \par retired police.

## 2023-04-18 NOTE — DISCUSSION/SUMMARY
[de-identified] : Progress Note completed by Meera Coulter PA-C\par * Dr. Cook -- The documentation recorded in this note accurately reflects the decisions made by me during this visit.

## 2023-04-18 NOTE — PHYSICAL EXAM
[5___] : hamstring 5[unfilled]/5 [Positive] : positive Mary [] : patient ambulates without assistive device [Left] : left knee [Degenerative change] : Degenerative change [TWNoteComboBox7] : flexion 115 degrees [de-identified] : extension 0 degrees

## 2023-04-25 ENCOUNTER — APPOINTMENT (OUTPATIENT)
Dept: ORTHOPEDIC SURGERY | Facility: CLINIC | Age: 63
End: 2023-04-25
Payer: COMMERCIAL

## 2023-04-25 VITALS — BODY MASS INDEX: 29.12 KG/M2 | HEIGHT: 72 IN | WEIGHT: 215 LBS

## 2023-04-25 DIAGNOSIS — M18.12 UNILATERAL PRIMARY OSTEOARTHRITIS OF FIRST CARPOMETACARPAL JOINT, LEFT HAND: ICD-10-CM

## 2023-04-25 DIAGNOSIS — M25.832 OTHER SPECIFIED JOINT DISORDERS, LEFT WRIST: ICD-10-CM

## 2023-04-25 PROCEDURE — 20612 ASPIRATE/INJ GANGLION CYST: CPT | Mod: LT

## 2023-04-25 PROCEDURE — 99213 OFFICE O/P EST LOW 20 MIN: CPT | Mod: 25

## 2023-04-25 PROCEDURE — 73110 X-RAY EXAM OF WRIST: CPT | Mod: LT

## 2023-04-25 NOTE — IMAGING
[Left] : left hand [de-identified] : Left volar / radial wrist small ganglion cyst / nttp.\par left wrist rom is full and pain free.\par Strength is 5/5 in all planes.\par There is no carpal instability. \par minimal ttp over the 1st CMC joint with negative Compression Test.  [FreeTextEntry1] : left 1st cmc joint OA is noted. no other pathology appreciated.

## 2023-04-25 NOTE — ASSESSMENT
[FreeTextEntry1] : The patient was advised of the diagnosis. The natural history of the pathology was explained in full to the patient in layman's terms. All questions were answered. The risks and benefits of surgical and non-surgical treatment alternatives were explained in full to the patient.\par We reviewed the pathology and treatment options- patient aware that options include observation, aspiration, surgery- aspiration with 50/50 chance of resolution, surgery usually 95-97% effective. Today the patient is amenable to aspiration,  Understands that with volar masses chance of arterial injury which may result in ecchymosis and occasional hematoma.  After sterile prep, aspirated jelly like substance consistent with ganglion\par Left volar wrist ganglion is aspirated today.\par \par Surgical intervention discussed. (declined)\par Pt lives in [place] and will rto prn. \par

## 2023-04-25 NOTE — HISTORY OF PRESENT ILLNESS
[3] : 3 [Dull/Aching] : dull/aching [Localized] : localized [Nothing helps with pain getting better] : Nothing helps with pain getting better [de-identified] : 4/25/2023: Pt here s/p left volar ganglion excision.\par Pt states that he has noted recurrence of ganglion.\par There is no hx of trauma.\par Pt denies pain, tingling or numbness.\par \par PMH: NKDA [] : no

## 2023-04-25 NOTE — REASON FOR VISIT
[FreeTextEntry2] : 04/25/2023 :NIRALI ENOCH , a 62 year old male, presents today for left wrist ganglion cyst \par

## 2023-05-02 ENCOUNTER — APPOINTMENT (OUTPATIENT)
Dept: ORTHOPEDIC SURGERY | Facility: CLINIC | Age: 63
End: 2023-05-02

## 2023-05-05 ENCOUNTER — APPOINTMENT (OUTPATIENT)
Dept: ORTHOPEDIC SURGERY | Facility: CLINIC | Age: 63
End: 2023-05-05
Payer: COMMERCIAL

## 2023-05-05 VITALS — BODY MASS INDEX: 29.12 KG/M2 | HEIGHT: 72 IN | WEIGHT: 215 LBS

## 2023-05-05 PROCEDURE — 99214 OFFICE O/P EST MOD 30 MIN: CPT | Mod: 25

## 2023-05-05 PROCEDURE — 20610 DRAIN/INJ JOINT/BURSA W/O US: CPT | Mod: LT

## 2023-05-05 NOTE — HISTORY OF PRESENT ILLNESS
[Other:____] : [unfilled] [de-identified] : 12/20/22:  acute onset of left knee pain after feeling a sharp pain in knee after getting a massage october 2022.  \par 1/10/23:  still pain.  some relief with csi last visit. \par 2/7/23:  continued pain.  having stiffness. \par 4/18/23:  left knee pain persists.\par 5/5/23:  left knee pain continues.  [] : This patient has had an injection before: no [FreeTextEntry1] : left knee

## 2023-05-05 NOTE — PROCEDURE
[FreeTextEntry3] : Procedure Name: Visco-3 (Large Joint)\par \par \par Viscosupplementation Injection: X-ray evidence of Osteoarthritis on this or prior visit and Patient has tried OTC's including aspirin, Ibuprofen, Aleve etc or prescription NSAIDS, and/or exercises at home and/ or physical therapy without satisfactory response.  The risks, benefits, and alternatives to Viscosupplementation injection were explained in full to the patient. Risks outlined include but are not limited to infection, sepsis, bleeding, scarring, skin discoloration, temporary increase in pain, syncopal episode, failure to resolve symptoms, allergic reaction, and symptom recurrence. Signs and symptoms of infection reviewed and patient advised to call immediately for redness, fevers, and/or chills. Patient understood the risks. All questions were answered. \par \par Oral informed consent was obtained.   Sterile technique was utilized for the procedure including the preparation of the solutions used for the injection. An injection of Visco-3 injection #1 was injected into LEFT KNEE.  Patient tolerated the procedure well. Advised to ice the injection site this evening.  Post Procedure Instructions: Patient was advised to call if redness, pain, or fever occur and apply ice for 15 min. out of every hour for the next 12-24 hours as tolerated. patient was advised to rest the joint(s) for 2 days.\par

## 2023-05-05 NOTE — DISCUSSION/SUMMARY
[de-identified] : Progress Note completed by Meera Coulter PA-C\par * Dr. Cook -- The documentation recorded in this note accurately reflects the decisions made by me during this visit.

## 2023-05-05 NOTE — PHYSICAL EXAM
[5___] : hamstring 5[unfilled]/5 [Positive] : positive Mary [] : patient ambulates without assistive device [Left] : left knee [Degenerative change] : Degenerative change [TWNoteComboBox7] : flexion 115 degrees [de-identified] : extension 0 degrees

## 2023-05-09 ENCOUNTER — APPOINTMENT (OUTPATIENT)
Dept: ORTHOPEDIC SURGERY | Facility: CLINIC | Age: 63
End: 2023-05-09
Payer: COMMERCIAL

## 2023-05-09 ENCOUNTER — APPOINTMENT (OUTPATIENT)
Dept: ORTHOPEDIC SURGERY | Facility: CLINIC | Age: 63
End: 2023-05-09

## 2023-05-09 VITALS — WEIGHT: 215 LBS | BODY MASS INDEX: 29.12 KG/M2 | HEIGHT: 72 IN

## 2023-05-09 PROCEDURE — 20606 DRAIN/INJ JOINT/BURSA W/US: CPT | Mod: RT

## 2023-05-09 PROCEDURE — 99214 OFFICE O/P EST MOD 30 MIN: CPT | Mod: 25

## 2023-05-09 PROCEDURE — 73080 X-RAY EXAM OF ELBOW: CPT | Mod: RT

## 2023-05-09 PROCEDURE — J3490M: CUSTOM

## 2023-05-09 RX ORDER — MELOXICAM 15 MG/1
15 TABLET ORAL
Qty: 30 | Refills: 2 | Status: ACTIVE | COMMUNITY
Start: 2023-05-09 | End: 1900-01-01

## 2023-05-11 NOTE — DISCUSSION/SUMMARY
[Medication Risks Reviewed] : Medication risks reviewed [Surgical risks reviewed] : Surgical risks reviewed [de-identified] : \par Reviewed patients X-Ray right elbow - benign. \par Patients radiographic imaging, activity status, and physical exam are consistent with lateral epicondylitis. Discussed with the patient that this is secondary to overuse and repeated stress. \par Discussed treatment options both operative vs non-operative. Due to risks of surgery, patient elects to hold off. \par Patient elected to receive right elbow 4/1. Advised patient to rest and ice the area. \par Advised patient to use counter force strap\par Prescribed patient Mobic, and discussed risks of side effects and timing and management of medication.  Side effects can include but are not limited to gi ulcers and irritation, as well as kidney failure and bleeding issues. Use as directed and take with food. \par \par Follow up 4 weeks

## 2023-05-11 NOTE — HISTORY OF PRESENT ILLNESS
[de-identified] : Patient is here for right elbow pain that began in 4/2023, not due to injury. Denies n/t. Pain radiates into forearm/hand. Weakness, and ROM is limited. Worsens with lifting. No prior injury. No formal treatment. Tried ice.

## 2023-05-11 NOTE — PHYSICAL EXAM
[NL (150)] : flexion 150 degrees [NL (0)] : extension 0 degrees [NL (90)] : supination 90 degrees [5___] : supination 5[unfilled]/5 [Right] : right elbow [There are no fractures, subluxations or dislocations. No significant abnormalities are seen] : There are no fractures, subluxations or dislocations. No significant abnormalities are seen [] : no laceration/abrasion [de-identified] : With pain

## 2023-05-11 NOTE — PROCEDURE
[Medium Joint Injection] : medium joint injection [Right] : of the right [Elbow Joint] : elbow joint [Pain] : pain [Betadine] : betadine [Ethyl Chloride sprayed topically] : ethyl chloride sprayed topically [Sterile technique used] : sterile technique used [Call if redness, pain or fever occur] : call if redness, pain or fever occur [Apply ice for 15min out of every hour for the next 12-24 hours as tolerated] : apply ice for 15 minutes out of every hour for the next 12-24 hours as tolerated [Patient was advised to rest the joint(s) for ____ days] : patient was advised to rest the joint(s) for [unfilled] days [Previous OTC use and PT nontherapeutic] : patient has tried OTC's including aspirin, Ibuprofen, Aleve, etc or prescription NSAIDS, and/or exercises at home and/or physical therapy without satisfactory response [Risks, benefits, alternatives discussed / Verbal consent obtained] : the risks benefits, and alternatives have been discussed, and verbal consent was obtained [FreeTextEntry1] : right elbow 4/1

## 2023-05-12 ENCOUNTER — APPOINTMENT (OUTPATIENT)
Dept: ORTHOPEDIC SURGERY | Facility: CLINIC | Age: 63
End: 2023-05-12
Payer: COMMERCIAL

## 2023-05-12 VITALS — BODY MASS INDEX: 29.12 KG/M2 | HEIGHT: 72 IN | WEIGHT: 215 LBS

## 2023-05-12 PROCEDURE — 99212 OFFICE O/P EST SF 10 MIN: CPT | Mod: 25

## 2023-05-12 PROCEDURE — 20610 DRAIN/INJ JOINT/BURSA W/O US: CPT | Mod: LT

## 2023-05-12 NOTE — PHYSICAL EXAM
[5___] : hamstring 5[unfilled]/5 [Positive] : positive Mary [] : patient ambulates without assistive device [Left] : left knee [Degenerative change] : Degenerative change [TWNoteComboBox7] : flexion 115 degrees [de-identified] : extension 0 degrees

## 2023-05-12 NOTE — PROCEDURE
[FreeTextEntry3] : Procedure Name: Visco-3 (Large Joint)\par \par \par Viscosupplementation Injection: X-ray evidence of Osteoarthritis on this or prior visit and Patient has tried OTC's including aspirin, Ibuprofen, Aleve etc or prescription NSAIDS, and/or exercises at home and/ or physical therapy without satisfactory response.  The risks, benefits, and alternatives to Viscosupplementation injection were explained in full to the patient. Risks outlined include but are not limited to infection, sepsis, bleeding, scarring, skin discoloration, temporary increase in pain, syncopal episode, failure to resolve symptoms, allergic reaction, and symptom recurrence. Signs and symptoms of infection reviewed and patient advised to call immediately for redness, fevers, and/or chills. Patient understood the risks. All questions were answered. \par \par Oral informed consent was obtained.   Sterile technique was utilized for the procedure including the preparation of the solutions used for the injection. An injection of Visco-3 injection #2 was injected into LEFT KNEE.  Patient tolerated the procedure well. Advised to ice the injection site this evening.  Post Procedure Instructions: Patient was advised to call if redness, pain, or fever occur and apply ice for 15 min. out of every hour for the next 12-24 hours as tolerated. patient was advised to rest the joint(s) for 2 days.\par

## 2023-05-12 NOTE — ASSESSMENT
[FreeTextEntry1] : acute onset of left knee pain after feeling a sharp pain in knee after getting a massage october 2022.   knee pain.  csi on 12/2022. mri 2023 shows lmt and some oa.  \par \par retired [employer].

## 2023-05-12 NOTE — DISCUSSION/SUMMARY
[de-identified] : Progress Note completed by Meera Coulter PA-C\par * Dr. Cook -- The documentation recorded in this note accurately reflects the decisions made by me during this visit.\par

## 2023-05-12 NOTE — HISTORY OF PRESENT ILLNESS
[2] : 2 [Other:____] : [unfilled] [de-identified] : 12/20/22:  acute onset of left knee pain after feeling a sharp pain in knee after getting a massage october 2022.  \par 1/10/23:  still pain.  some relief with csi last visit. \par 2/7/23:  continued pain.  having stiffness. \par 4/18/23:  left knee pain persists.\par 5/5/23:  left knee pain continues. \par 5/12/23: left knee pain persists.\par 5/12/23:  left knee pain still.  no adverse reactions.  [] : This patient has had an injection before: no [FreeTextEntry1] : left knee  [de-identified] : 05/05/23

## 2023-05-19 ENCOUNTER — APPOINTMENT (OUTPATIENT)
Dept: ORTHOPEDIC SURGERY | Facility: CLINIC | Age: 63
End: 2023-05-19
Payer: COMMERCIAL

## 2023-05-19 VITALS — WEIGHT: 215 LBS | BODY MASS INDEX: 29.12 KG/M2 | HEIGHT: 72 IN

## 2023-05-19 DIAGNOSIS — M17.12 UNILATERAL PRIMARY OSTEOARTHRITIS, LEFT KNEE: ICD-10-CM

## 2023-05-19 PROCEDURE — 20610 DRAIN/INJ JOINT/BURSA W/O US: CPT | Mod: LT

## 2023-05-19 PROCEDURE — 99212 OFFICE O/P EST SF 10 MIN: CPT | Mod: 25

## 2023-05-19 NOTE — DISCUSSION/SUMMARY
[de-identified] : Progress Note completed by Meera Coulter PA-C\par * Dr. Cook -- The documentation recorded in this note accurately reflects the decisions made by me during this visit.\par

## 2023-05-19 NOTE — HISTORY OF PRESENT ILLNESS
[] : This patient has had an injection before: yes [3] : 3 [Other:____] : [unfilled] [de-identified] : 12/20/22:  acute onset of left knee pain after feeling a sharp pain in knee after getting a massage october 2022.  \par 1/10/23:  still pain.  some relief with csi last visit. \par 2/7/23:  continued pain.  having stiffness. \par 4/18/23:  left knee pain persists.\par 5/5/23:  left knee pain continues. \par 5/12/23: left knee pain persists.\par 5/12/23:  left knee pain still.  no adverse reactions. \par 5/19/23:  left knee pain persists.  [FreeTextEntry1] : left knee  [de-identified] : 05/12/23

## 2023-05-19 NOTE — PROCEDURE
[FreeTextEntry3] : Procedure Name: Visco-3 (Large Joint)\par \par \par Viscosupplementation Injection: X-ray evidence of Osteoarthritis on this or prior visit and Patient has tried OTC's including aspirin, Ibuprofen, Aleve etc or prescription NSAIDS, and/or exercises at home and/ or physical therapy without satisfactory response.  The risks, benefits, and alternatives to Viscosupplementation injection were explained in full to the patient. Risks outlined include but are not limited to infection, sepsis, bleeding, scarring, skin discoloration, temporary increase in pain, syncopal episode, failure to resolve symptoms, allergic reaction, and symptom recurrence. Signs and symptoms of infection reviewed and patient advised to call immediately for redness, fevers, and/or chills. Patient understood the risks. All questions were answered. \par \par Oral informed consent was obtained.   Sterile technique was utilized for the procedure including the preparation of the solutions used for the injection. An injection of Visco-3 injection #3 was injected into LEFT KNEE.  Patient tolerated the procedure well. Advised to ice the injection site this evening.  Post Procedure Instructions: Patient was advised to call if redness, pain, or fever occur and apply ice for 15 min. out of every hour for the next 12-24 hours as tolerated. patient was advised to rest the joint(s) for 2 days.\par

## 2023-05-19 NOTE — PHYSICAL EXAM
[5___] : hamstring 5[unfilled]/5 [Positive] : positive Mary [] : patient ambulates without assistive device [Left] : left knee [Degenerative change] : Degenerative change [TWNoteComboBox7] : flexion 115 degrees [de-identified] : extension 0 degrees

## 2023-06-05 ENCOUNTER — APPOINTMENT (OUTPATIENT)
Dept: ORTHOPEDIC SURGERY | Facility: CLINIC | Age: 63
End: 2023-06-05

## 2023-06-09 ENCOUNTER — APPOINTMENT (OUTPATIENT)
Dept: ORTHOPEDIC SURGERY | Facility: CLINIC | Age: 63
End: 2023-06-09
Payer: COMMERCIAL

## 2023-06-09 VITALS — HEIGHT: 72 IN | BODY MASS INDEX: 29.12 KG/M2 | WEIGHT: 215 LBS

## 2023-06-09 DIAGNOSIS — M23.92 UNSPECIFIED INTERNAL DERANGEMENT OF LEFT KNEE: ICD-10-CM

## 2023-06-09 PROCEDURE — 99212 OFFICE O/P EST SF 10 MIN: CPT

## 2023-06-09 NOTE — ASSESSMENT
[FreeTextEntry1] : acute onset of left knee pain after feeling a sharp pain in knee after getting a massage october 2022.   knee pain.  csi on 12/2022. mri 2023 shows lmt and some oa.  improved with visco 5/19/23\par \par retired [employer].

## 2023-06-09 NOTE — HISTORY OF PRESENT ILLNESS
[0] : 0 [Localized] : localized [de-identified] : 12/20/22:  acute onset of left knee pain after feeling a sharp pain in knee after getting a massage october 2022.  \par 1/10/23:  still pain.  some relief with csi last visit. \par 2/7/23:  continued pain.  having stiffness. \par 4/18/23:  left knee pain persists.\par 5/5/23:  left knee pain continues. \par 5/12/23: left knee pain persists.\par 5/12/23:  left knee pain still.  no adverse reactions. \par 5/19/23:  left knee pain persists. \par 6/9/23:  left knee improved.  [FreeTextEntry1] : left knee

## 2023-06-09 NOTE — PHYSICAL EXAM
[5___] : hamstring 5[unfilled]/5 [Left] : left knee [Degenerative change] : Degenerative change [] : no tenderness [TWNoteComboBox7] : flexion 130 degrees [de-identified] : extension 0 degrees

## 2023-06-09 NOTE — DISCUSSION/SUMMARY
[de-identified] : Progress Note completed by Meera Coulter PA-C\par * Dr. Cook -- The documentation recorded in this note accurately reflects the decisions made by me during this visit.\par

## 2023-07-17 ENCOUNTER — APPOINTMENT (OUTPATIENT)
Dept: ORTHOPEDIC SURGERY | Facility: CLINIC | Age: 63
End: 2023-07-17
Payer: COMMERCIAL

## 2023-07-17 VITALS — HEIGHT: 72 IN | BODY MASS INDEX: 29.12 KG/M2 | WEIGHT: 215 LBS

## 2023-07-17 PROCEDURE — 20612 ASPIRATE/INJ GANGLION CYST: CPT

## 2023-07-17 PROCEDURE — 99213 OFFICE O/P EST LOW 20 MIN: CPT | Mod: 25

## 2023-07-17 NOTE — ASSESSMENT
[FreeTextEntry1] : We reviewed the pathology and treatment options- patient aware that options include observation, aspiration, surgery- aspiration with 50/50 chance of resolution, surgery usually 95-97% effective. Today the patient is amenable to aspiration,  Understands that with volar masses chance of arterial injury which may result in ecchymosis and occasional hematoma.  After sterile prep, aspirated jelly like substance consistent with ganglion

## 2023-07-17 NOTE — HISTORY OF PRESENT ILLNESS
[3] : 3 [Dull/Aching] : dull/aching [Localized] : localized [Nothing helps with pain getting better] : Nothing helps with pain getting better [de-identified] : 4/25/2023: Pt here s/p left volar ganglion excision.\par Pt states that he has noted recurrence of ganglion.\par There is no hx of trauma.\par Pt denies pain, tingling or numbness.\par \par PMH: NKDA [] : no

## 2023-07-18 ENCOUNTER — FORM ENCOUNTER (OUTPATIENT)
Age: 63
End: 2023-07-18

## 2023-07-18 ENCOUNTER — APPOINTMENT (OUTPATIENT)
Dept: ORTHOPEDIC SURGERY | Facility: CLINIC | Age: 63
End: 2023-07-18
Payer: COMMERCIAL

## 2023-07-18 VITALS — WEIGHT: 215 LBS | BODY MASS INDEX: 29.12 KG/M2 | HEIGHT: 72 IN

## 2023-07-18 PROCEDURE — 99214 OFFICE O/P EST MOD 30 MIN: CPT

## 2023-07-19 ENCOUNTER — APPOINTMENT (OUTPATIENT)
Dept: MRI IMAGING | Facility: CLINIC | Age: 63
End: 2023-07-19
Payer: COMMERCIAL

## 2023-07-19 PROCEDURE — 73221 MRI JOINT UPR EXTREM W/O DYE: CPT | Mod: RT

## 2023-07-20 NOTE — PHYSICAL EXAM
[NL (150)] : flexion 150 degrees [NL (0)] : extension 0 degrees [NL (90)] : supination 90 degrees [5___] : supination 5[unfilled]/5 [Right] : right elbow [There are no fractures, subluxations or dislocations. No significant abnormalities are seen] : There are no fractures, subluxations or dislocations. No significant abnormalities are seen [] : no laceration/abrasion [de-identified] : With pain [de-identified] : Pain with middle and index finger resisted extension

## 2023-07-20 NOTE — HISTORY OF PRESENT ILLNESS
[de-identified] : Pt is here for a follow up of the right elbow. Had Celestone injection in the right elbow on 5/4/23, notes it only helped for a short time. Has been taking Meloxicam and Tylenol for the right elbow which has not helped. Having pain in the right elbow when extending right elbow and doing certain movements. Not doing PT, he currently lives in [place].

## 2023-07-20 NOTE — DISCUSSION/SUMMARY
[Medication Risks Reviewed] : Medication risks reviewed [Surgical risks reviewed] : Surgical risks reviewed [de-identified] : The patient reports minimal improvement from previous right elbow 4/1 injection on 03/09/23\par \par Due to worsening pain and instability with mechanical symptoms, recommend the patient obtain MRI right elbow to rule out lateral epicondylitis vs tearing. Follow up after MRI to possibly rule out surgical pathology and discuss future treatment options. \par \par Discussed possible further treatment in the form of PRP vs arthroscopic surgery. \par \par Continue physical therapy. \par \par Would hold off on surgery secondary to high risk. Recommended more PT before further discussing surgery. \par \par Advised patient to use counter force strap\par \par Prescribed patient Mobic, and discussed risks of side effects and timing and management of medication.  Side effects can include but are not limited to gi ulcers and irritation, as well as kidney failure and bleeding issues. Use as directed and take with food. \par \par Follow up after MRI

## 2023-07-25 ENCOUNTER — APPOINTMENT (OUTPATIENT)
Dept: ORTHOPEDIC SURGERY | Facility: CLINIC | Age: 63
End: 2023-07-25
Payer: COMMERCIAL

## 2023-07-25 VITALS — WEIGHT: 215 LBS | HEIGHT: 72 IN | BODY MASS INDEX: 29.12 KG/M2

## 2023-07-25 PROCEDURE — 99214 OFFICE O/P EST MOD 30 MIN: CPT

## 2023-07-25 PROCEDURE — L3908: CPT | Mod: RT

## 2023-07-25 RX ORDER — INDOMETHACIN 75 MG/1
75 CAPSULE, EXTENDED RELEASE ORAL TWICE DAILY
Qty: 60 | Refills: 0 | Status: DISCONTINUED | COMMUNITY
Start: 2022-08-02 | End: 2023-07-25

## 2023-07-25 RX ORDER — TIZANIDINE 4 MG/1
4 TABLET ORAL TWICE DAILY
Qty: 28 | Refills: 0 | Status: DISCONTINUED | COMMUNITY
Start: 2022-08-08 | End: 2023-07-25

## 2023-07-25 RX ORDER — HYDROCODONE BITARTRATE AND ACETAMINOPHEN 5; 325 MG/1; MG/1
5-325 TABLET ORAL
Qty: 15 | Refills: 0 | Status: DISCONTINUED | COMMUNITY
Start: 2022-09-02 | End: 2023-07-25

## 2023-07-27 NOTE — PHYSICAL EXAM
"Pt had moderate bowel movement following enema and states \"that feels a lot better now\".   " [Right] : right elbow [NL (150)] : flexion 150 degrees [NL (0)] : extension 0 degrees [NL (90)] : supination 90 degrees [5___] : supination 5[unfilled]/5 [] : no tenderness over medial epicondyle [de-identified] : With pain [de-identified] : Pain with middle and index finger resisted extension

## 2023-07-27 NOTE — DATA REVIEWED
[MRI] : MRI [Right] : of the right [Elbow] : elbow [Report was reviewed and noted in the chart] : The report was reviewed and noted in the chart [I independently reviewed and interpreted images and report] : I independently reviewed and interpreted images and report [I reviewed the films/CD] : I reviewed the films/CD [FreeTextEntry1] : MRI of left elbow reveals UCL sprain, mild ulnar neuritis in the cubital tunnel without compression or dislocation of the nerve, mild distal triceps tendinopathy, mild distal biceps tendinopathy.

## 2023-07-27 NOTE — HISTORY OF PRESENT ILLNESS
[de-identified] : Patient is here to follow up on right elbow MRI. Notes no improvement, as pain persists with lifting/bending. Had celestone injection on 5/9/23.

## 2023-07-27 NOTE — DISCUSSION/SUMMARY
[Medication Risks Reviewed] : Medication risks reviewed [Surgical risks reviewed] : Surgical risks reviewed [de-identified] : \par \par MRI of right elbow reveals UCL sprain, mild ulnar neuritis in the cubital tunnel without compression or dislocation of the nerve, mild distal triceps tendinopathy, mild distal biceps tendinopathy. \par \par We reviewed the mri findings and discussed treatment options, both operative and non operative. Discussed risks of potential surgery. However, due to the risks of the surgery, we will try NSAIDs and therapy. Discussed management of medication.\par \par Discussed possible injection therapy vs arthroscopic surgery. Would hold off as he is doing well upon office visit. WIll discuss Sx if he continues to fail conservative treatment modalities. \par \par The patient will begin use of Cock-up wrist splint to ensure proper healing - fitted and dispensed in office today. \par Prescribed patient Mobic, and discussed risks of side effects and timing and management of medication.  Side effects can include but are not limited to gi ulcers and irritation, as well as kidney failure and bleeding issues. Use as directed and take with food.\par \par Follow up 4-6 weeks \par

## 2023-10-10 ENCOUNTER — APPOINTMENT (OUTPATIENT)
Dept: ORTHOPEDIC SURGERY | Facility: CLINIC | Age: 63
End: 2023-10-10
Payer: COMMERCIAL

## 2023-10-10 VITALS — WEIGHT: 215 LBS | BODY MASS INDEX: 29.12 KG/M2 | HEIGHT: 72 IN

## 2023-10-10 DIAGNOSIS — M67.432 GANGLION, LEFT WRIST: ICD-10-CM

## 2023-10-10 PROCEDURE — 99212 OFFICE O/P EST SF 10 MIN: CPT

## 2023-10-11 ENCOUNTER — APPOINTMENT (OUTPATIENT)
Dept: ORTHOPEDIC SURGERY | Facility: CLINIC | Age: 63
End: 2023-10-11
Payer: COMMERCIAL

## 2023-10-11 DIAGNOSIS — M77.10 LATERAL EPICONDYLITIS, UNSPECIFIED ELBOW: ICD-10-CM

## 2023-10-11 PROCEDURE — 99213 OFFICE O/P EST LOW 20 MIN: CPT

## 2023-10-11 RX ORDER — MELOXICAM 15 MG/1
15 TABLET ORAL DAILY
Qty: 30 | Refills: 1 | Status: ACTIVE | COMMUNITY
Start: 2023-10-11 | End: 1900-01-01

## 2023-10-19 PROBLEM — M77.10 LATERAL EPICONDYLITIS: Status: ACTIVE | Noted: 2023-05-09
